# Patient Record
Sex: FEMALE | Race: ASIAN | Employment: UNEMPLOYED | URBAN - METROPOLITAN AREA
[De-identification: names, ages, dates, MRNs, and addresses within clinical notes are randomized per-mention and may not be internally consistent; named-entity substitution may affect disease eponyms.]

---

## 2019-08-27 ENCOUNTER — HOSPITAL ENCOUNTER (OUTPATIENT)
Age: 39
Setting detail: OBSERVATION
Discharge: ANOTHER HOSPITAL | End: 2019-08-28
Attending: EMERGENCY MEDICINE | Admitting: FAMILY MEDICINE
Payer: COMMERCIAL

## 2019-08-27 ENCOUNTER — APPOINTMENT (OUTPATIENT)
Dept: CT IMAGING | Age: 39
End: 2019-08-27
Attending: NURSE PRACTITIONER
Payer: COMMERCIAL

## 2019-08-27 DIAGNOSIS — R45.1 RESTLESSNESS AND AGITATION: ICD-10-CM

## 2019-08-27 DIAGNOSIS — T50.904A INGESTION OF UNKNOWN DRUG, UNDETERMINED INTENT, INITIAL ENCOUNTER: Primary | ICD-10-CM

## 2019-08-27 PROBLEM — T65.91XA INGESTION OF TOXIC SUBSTANCE: Status: ACTIVE | Noted: 2019-08-27

## 2019-08-27 LAB
ALBUMIN SERPL-MCNC: 4.3 G/DL (ref 3.5–5)
ALBUMIN/GLOB SERPL: 1.2 {RATIO} (ref 1.1–2.2)
ALP SERPL-CCNC: 45 U/L (ref 45–117)
ALT SERPL-CCNC: 33 U/L (ref 12–78)
AMPHET UR QL SCN: NEGATIVE
ANION GAP SERPL CALC-SCNC: 10 MMOL/L (ref 5–15)
APAP SERPL-MCNC: <2 UG/ML (ref 10–30)
APPEARANCE UR: CLEAR
AST SERPL-CCNC: 18 U/L (ref 15–37)
BACTERIA URNS QL MICRO: NEGATIVE /HPF
BARBITURATES UR QL SCN: NEGATIVE
BASOPHILS # BLD: 0 K/UL (ref 0–0.1)
BASOPHILS NFR BLD: 0 % (ref 0–1)
BENZODIAZ UR QL: NEGATIVE
BILIRUB SERPL-MCNC: 0.3 MG/DL (ref 0.2–1)
BILIRUB UR QL: NEGATIVE
BUN SERPL-MCNC: 10 MG/DL (ref 6–20)
BUN/CREAT SERPL: 15 (ref 12–20)
CALCIUM SERPL-MCNC: 9.8 MG/DL (ref 8.5–10.1)
CANNABINOIDS UR QL SCN: NEGATIVE
CHLORIDE SERPL-SCNC: 104 MMOL/L (ref 97–108)
CK MB CFR SERPL CALC: NORMAL % (ref 0–2.5)
CK MB SERPL-MCNC: <1 NG/ML (ref 5–25)
CK SERPL-CCNC: 101 U/L (ref 26–192)
CO2 SERPL-SCNC: 27 MMOL/L (ref 21–32)
COCAINE UR QL SCN: NEGATIVE
COLOR UR: NORMAL
CREAT SERPL-MCNC: 0.66 MG/DL (ref 0.55–1.02)
DIFFERENTIAL METHOD BLD: NORMAL
DRUG SCRN COMMENT,DRGCM: NORMAL
EOSINOPHIL # BLD: 0 K/UL (ref 0–0.4)
EOSINOPHIL NFR BLD: 0 % (ref 0–7)
EPITH CASTS URNS QL MICRO: NORMAL /LPF
ERYTHROCYTE [DISTWIDTH] IN BLOOD BY AUTOMATED COUNT: 11.6 % (ref 11.5–14.5)
ETHANOL SERPL-MCNC: <10 MG/DL
GLOBULIN SER CALC-MCNC: 3.5 G/DL (ref 2–4)
GLUCOSE SERPL-MCNC: 106 MG/DL (ref 65–100)
GLUCOSE UR STRIP.AUTO-MCNC: NEGATIVE MG/DL
HCT VFR BLD AUTO: 40.1 % (ref 35–47)
HGB BLD-MCNC: 13.6 G/DL (ref 11.5–16)
HGB UR QL STRIP: NEGATIVE
IMM GRANULOCYTES # BLD AUTO: 0 K/UL (ref 0–0.04)
IMM GRANULOCYTES NFR BLD AUTO: 0 % (ref 0–0.5)
KETONES UR QL STRIP.AUTO: NEGATIVE MG/DL
LEUKOCYTE ESTERASE UR QL STRIP.AUTO: NEGATIVE
LYMPHOCYTES # BLD: 1.8 K/UL (ref 0.8–3.5)
LYMPHOCYTES NFR BLD: 23 % (ref 12–49)
MCH RBC QN AUTO: 30.9 PG (ref 26–34)
MCHC RBC AUTO-ENTMCNC: 33.9 G/DL (ref 30–36.5)
MCV RBC AUTO: 91.1 FL (ref 80–99)
METHADONE UR QL: NEGATIVE
MONOCYTES # BLD: 0.5 K/UL (ref 0–1)
MONOCYTES NFR BLD: 6 % (ref 5–13)
NEUTS SEG # BLD: 5.5 K/UL (ref 1.8–8)
NEUTS SEG NFR BLD: 71 % (ref 32–75)
NITRITE UR QL STRIP.AUTO: NEGATIVE
NRBC # BLD: 0 K/UL (ref 0–0.01)
NRBC BLD-RTO: 0 PER 100 WBC
OPIATES UR QL: NEGATIVE
PCP UR QL: NEGATIVE
PH UR STRIP: 7.5 [PH] (ref 5–8)
PLATELET # BLD AUTO: 231 K/UL (ref 150–400)
PMV BLD AUTO: 10.2 FL (ref 8.9–12.9)
POTASSIUM SERPL-SCNC: 3.7 MMOL/L (ref 3.5–5.1)
PROT SERPL-MCNC: 7.8 G/DL (ref 6.4–8.2)
PROT UR STRIP-MCNC: NEGATIVE MG/DL
RBC # BLD AUTO: 4.4 M/UL (ref 3.8–5.2)
RBC #/AREA URNS HPF: NORMAL /HPF (ref 0–5)
SALICYLATES SERPL-MCNC: <1.7 MG/DL (ref 2.8–20)
SODIUM SERPL-SCNC: 141 MMOL/L (ref 136–145)
SP GR UR REFRACTOMETRY: <1.005 (ref 1–1.03)
TROPONIN I SERPL-MCNC: <0.05 NG/ML
UA: UC IF INDICATED,UAUC: NORMAL
UROBILINOGEN UR QL STRIP.AUTO: 0.2 EU/DL (ref 0.2–1)
WBC # BLD AUTO: 7.9 K/UL (ref 3.6–11)
WBC URNS QL MICRO: NORMAL /HPF (ref 0–4)

## 2019-08-27 PROCEDURE — 99285 EMERGENCY DEPT VISIT HI MDM: CPT

## 2019-08-27 PROCEDURE — 96376 TX/PRO/DX INJ SAME DRUG ADON: CPT

## 2019-08-27 PROCEDURE — 80053 COMPREHEN METABOLIC PANEL: CPT

## 2019-08-27 PROCEDURE — 81001 URINALYSIS AUTO W/SCOPE: CPT

## 2019-08-27 PROCEDURE — 74011250636 HC RX REV CODE- 250/636: Performed by: EMERGENCY MEDICINE

## 2019-08-27 PROCEDURE — 74011250636 HC RX REV CODE- 250/636: Performed by: NURSE PRACTITIONER

## 2019-08-27 PROCEDURE — 85025 COMPLETE CBC W/AUTO DIFF WBC: CPT

## 2019-08-27 PROCEDURE — 99218 HC RM OBSERVATION: CPT

## 2019-08-27 PROCEDURE — 80307 DRUG TEST PRSMV CHEM ANLYZR: CPT

## 2019-08-27 PROCEDURE — 93005 ELECTROCARDIOGRAM TRACING: CPT

## 2019-08-27 PROCEDURE — 84484 ASSAY OF TROPONIN QUANT: CPT

## 2019-08-27 PROCEDURE — 36415 COLL VENOUS BLD VENIPUNCTURE: CPT

## 2019-08-27 PROCEDURE — 82550 ASSAY OF CK (CPK): CPT

## 2019-08-27 PROCEDURE — 96374 THER/PROPH/DIAG INJ IV PUSH: CPT

## 2019-08-27 PROCEDURE — 84703 CHORIONIC GONADOTROPIN ASSAY: CPT

## 2019-08-27 RX ORDER — LORAZEPAM 2 MG/ML
1 INJECTION INTRAMUSCULAR
Status: DISCONTINUED | OUTPATIENT
Start: 2019-08-27 | End: 2019-08-28

## 2019-08-27 RX ORDER — LORAZEPAM 2 MG/ML
1 INJECTION INTRAMUSCULAR
Status: COMPLETED | OUTPATIENT
Start: 2019-08-27 | End: 2019-08-27

## 2019-08-27 RX ADMIN — SODIUM CHLORIDE 1000 ML: 900 INJECTION, SOLUTION INTRAVENOUS at 19:51

## 2019-08-27 RX ADMIN — LORAZEPAM 1 MG: 2 INJECTION INTRAMUSCULAR; INTRAVENOUS at 21:30

## 2019-08-27 RX ADMIN — LORAZEPAM 1 MG: 2 INJECTION INTRAMUSCULAR; INTRAVENOUS at 22:11

## 2019-08-27 RX ADMIN — LORAZEPAM 1 MG: 2 INJECTION INTRAMUSCULAR; INTRAVENOUS at 23:05

## 2019-08-27 NOTE — ED NOTES
Primarily Mandarin speaking patient presents to ED with , son and daughter. Per  children, patient has not been acting herself this afternoon. Patient works in a "[employer] had complained of feet pain and took unknown [facility] herbal medication for c/o pain.  states patient has been acting unusual since this afternoon. Family denies known medical, surgical or social history.  reports patient has occasional c/o headaches.  denies any known regular medications. Patient is alert to name only. Has difficulty following commands. Appears confused to place, time and events. Emergency Department Nursing Plan of Care       The Nursing Plan of Care is developed from the Nursing assessment and Emergency Department Attending provider initial evaluation. The plan of care may be reviewed in the ED Provider note.     The Plan of Care was developed with the following considerations:   Patient / Family readiness to learn indicated by:does not verbalize understanding  Persons(s) to be included in education: patient, family and care giver  Barriers to Learning/Limitations:Mandarin    Signed     Bruno Wagner RN    8/27/2019   7:11 PM

## 2019-08-27 NOTE — ED NOTES
Verbal shift change report given to Zeus Arnold (oncoming nurse) by Shaila Tate RN (offgoing nurse). Report included the following information SBAR.

## 2019-08-27 NOTE — ED TRIAGE NOTES
Patient speaks very little [place], she has been working in [employer] today and is overheated, has vomited and has a headache. Pt is extremely anxious and has trouble following directions, her son assisting with translation in triage.

## 2019-08-27 NOTE — ED NOTES
Patients son reports primary language is [facility], [address]  Primary RN aware.

## 2019-08-28 ENCOUNTER — APPOINTMENT (OUTPATIENT)
Dept: CT IMAGING | Age: 39
End: 2019-08-28
Attending: NURSE PRACTITIONER
Payer: COMMERCIAL

## 2019-08-28 ENCOUNTER — APPOINTMENT (OUTPATIENT)
Dept: CT IMAGING | Age: 39
End: 2019-08-28
Attending: EMERGENCY MEDICINE
Payer: COMMERCIAL

## 2019-08-28 VITALS
HEART RATE: 87 BPM | OXYGEN SATURATION: 97 % | SYSTOLIC BLOOD PRESSURE: 138 MMHG | WEIGHT: 135 LBS | DIASTOLIC BLOOD PRESSURE: 97 MMHG | TEMPERATURE: 97.7 F | RESPIRATION RATE: 16 BRPM

## 2019-08-28 LAB
ATRIAL RATE: 79 BPM
CALCULATED P AXIS, ECG09: 60 DEGREES
CALCULATED R AXIS, ECG10: 51 DEGREES
CALCULATED T AXIS, ECG11: 30 DEGREES
CK MB CFR SERPL CALC: 1.1 % (ref 0–2.5)
CK MB SERPL-MCNC: 13.1 NG/ML (ref 5–25)
CK SERPL-CCNC: 1188 U/L (ref 26–192)
DIAGNOSIS, 93000: NORMAL
HCG SERPL QL: NEGATIVE
P-R INTERVAL, ECG05: 146 MS
Q-T INTERVAL, ECG07: 386 MS
QRS DURATION, ECG06: 84 MS
QTC CALCULATION (BEZET), ECG08: 442 MS
VENTRICULAR RATE, ECG03: 79 BPM

## 2019-08-28 PROCEDURE — 96374 THER/PROPH/DIAG INJ IV PUSH: CPT

## 2019-08-28 PROCEDURE — 99218 HC RM OBSERVATION: CPT

## 2019-08-28 PROCEDURE — 70450 CT HEAD/BRAIN W/O DYE: CPT

## 2019-08-28 PROCEDURE — 74011250637 HC RX REV CODE- 250/637: Performed by: EMERGENCY MEDICINE

## 2019-08-28 PROCEDURE — 96372 THER/PROPH/DIAG INJ SC/IM: CPT

## 2019-08-28 PROCEDURE — 96376 TX/PRO/DX INJ SAME DRUG ADON: CPT

## 2019-08-28 PROCEDURE — 82550 ASSAY OF CK (CPK): CPT

## 2019-08-28 PROCEDURE — 74011250636 HC RX REV CODE- 250/636: Performed by: EMERGENCY MEDICINE

## 2019-08-28 PROCEDURE — 74011250636 HC RX REV CODE- 250/636: Performed by: FAMILY MEDICINE

## 2019-08-28 PROCEDURE — 36415 COLL VENOUS BLD VENIPUNCTURE: CPT

## 2019-08-28 PROCEDURE — 74011250636 HC RX REV CODE- 250/636: Performed by: HOSPITALIST

## 2019-08-28 PROCEDURE — 96375 TX/PRO/DX INJ NEW DRUG ADDON: CPT

## 2019-08-28 RX ORDER — DIPHENHYDRAMINE HCL 25 MG
25 CAPSULE ORAL
Status: DISCONTINUED | OUTPATIENT
Start: 2019-08-28 | End: 2019-08-28

## 2019-08-28 RX ORDER — ONDANSETRON 2 MG/ML
4 INJECTION INTRAMUSCULAR; INTRAVENOUS
Qty: 1 VIAL | Refills: 0 | Status: SHIPPED
Start: 2019-08-28

## 2019-08-28 RX ORDER — ONDANSETRON 2 MG/ML
4 INJECTION INTRAMUSCULAR; INTRAVENOUS
Status: DISCONTINUED | OUTPATIENT
Start: 2019-08-28 | End: 2019-08-28 | Stop reason: HOSPADM

## 2019-08-28 RX ORDER — HALOPERIDOL 5 MG/ML
5 INJECTION INTRAMUSCULAR ONCE
Status: COMPLETED | OUTPATIENT
Start: 2019-08-28 | End: 2019-08-28

## 2019-08-28 RX ORDER — LORAZEPAM 2 MG/ML
1 INJECTION INTRAMUSCULAR
Status: DISCONTINUED | OUTPATIENT
Start: 2019-08-28 | End: 2019-08-28

## 2019-08-28 RX ORDER — SODIUM CHLORIDE 0.9 % (FLUSH) 0.9 %
5-40 SYRINGE (ML) INJECTION EVERY 8 HOURS
Status: DISCONTINUED | OUTPATIENT
Start: 2019-08-28 | End: 2019-08-28 | Stop reason: HOSPADM

## 2019-08-28 RX ORDER — DIPHENHYDRAMINE HYDROCHLORIDE 50 MG/ML
25 INJECTION, SOLUTION INTRAMUSCULAR; INTRAVENOUS ONCE
Status: COMPLETED | OUTPATIENT
Start: 2019-08-28 | End: 2019-08-28

## 2019-08-28 RX ORDER — LORAZEPAM 2 MG/ML
1 INJECTION INTRAMUSCULAR
Status: DISCONTINUED | OUTPATIENT
Start: 2019-08-28 | End: 2019-08-28 | Stop reason: HOSPADM

## 2019-08-28 RX ORDER — SODIUM CHLORIDE 9 MG/ML
125 INJECTION, SOLUTION INTRAVENOUS CONTINUOUS
Status: DISCONTINUED | OUTPATIENT
Start: 2019-08-28 | End: 2019-08-28 | Stop reason: HOSPADM

## 2019-08-28 RX ORDER — DIPHENHYDRAMINE HYDROCHLORIDE 50 MG/ML
INJECTION, SOLUTION INTRAMUSCULAR; INTRAVENOUS
Status: DISPENSED
Start: 2019-08-28 | End: 2019-08-28

## 2019-08-28 RX ORDER — DIPHENHYDRAMINE HYDROCHLORIDE 50 MG/ML
25 INJECTION, SOLUTION INTRAMUSCULAR; INTRAVENOUS
Status: COMPLETED | OUTPATIENT
Start: 2019-08-28 | End: 2019-08-28

## 2019-08-28 RX ORDER — LORAZEPAM 2 MG/ML
2 INJECTION INTRAMUSCULAR ONCE
Status: COMPLETED | OUTPATIENT
Start: 2019-08-28 | End: 2019-08-28

## 2019-08-28 RX ORDER — LORAZEPAM 2 MG/ML
1 INJECTION INTRAMUSCULAR
Qty: 1 VIAL | Refills: 0 | Status: SHIPPED
Start: 2019-08-28

## 2019-08-28 RX ORDER — SODIUM CHLORIDE 0.9 % (FLUSH) 0.9 %
5-40 SYRINGE (ML) INJECTION AS NEEDED
Status: DISCONTINUED | OUTPATIENT
Start: 2019-08-28 | End: 2019-08-28 | Stop reason: HOSPADM

## 2019-08-28 RX ORDER — LORAZEPAM 2 MG/ML
1 INJECTION INTRAMUSCULAR
Status: COMPLETED | OUTPATIENT
Start: 2019-08-28 | End: 2019-08-28

## 2019-08-28 RX ORDER — SODIUM CHLORIDE 9 MG/ML
125 INJECTION, SOLUTION INTRAVENOUS CONTINUOUS
Qty: 1000 ML | Refills: 0 | Status: SHIPPED
Start: 2019-08-28

## 2019-08-28 RX ADMIN — HALOPERIDOL LACTATE 5 MG: 5 INJECTION, SOLUTION INTRAMUSCULAR at 07:02

## 2019-08-28 RX ADMIN — Medication 10 ML: at 06:07

## 2019-08-28 RX ADMIN — LORAZEPAM 1 MG: 2 INJECTION INTRAMUSCULAR; INTRAVENOUS at 00:41

## 2019-08-28 RX ADMIN — LORAZEPAM 1 MG: 2 INJECTION INTRAMUSCULAR; INTRAVENOUS at 03:57

## 2019-08-28 RX ADMIN — LORAZEPAM 1 MG: 2 INJECTION INTRAMUSCULAR; INTRAVENOUS at 12:38

## 2019-08-28 RX ADMIN — DIPHENHYDRAMINE HYDROCHLORIDE 25 MG: 50 INJECTION, SOLUTION INTRAMUSCULAR; INTRAVENOUS at 00:32

## 2019-08-28 RX ADMIN — Medication 10 ML: at 04:49

## 2019-08-28 RX ADMIN — SODIUM CHLORIDE 125 ML/HR: 900 INJECTION, SOLUTION INTRAVENOUS at 04:59

## 2019-08-28 RX ADMIN — DIPHENHYDRAMINE HYDROCHLORIDE 25 MG: 50 INJECTION INTRAMUSCULAR; INTRAVENOUS at 01:19

## 2019-08-28 RX ADMIN — HALOPERIDOL LACTATE 5 MG: 5 INJECTION, SOLUTION INTRAMUSCULAR at 00:59

## 2019-08-28 RX ADMIN — LORAZEPAM 1 MG: 2 INJECTION INTRAMUSCULAR; INTRAVENOUS at 11:08

## 2019-08-28 RX ADMIN — LORAZEPAM 2 MG: 2 INJECTION INTRAMUSCULAR; INTRAVENOUS at 06:06

## 2019-08-28 NOTE — ED NOTES
Per patients children, pt has not been sleeping for the past 2 weeks. 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 Pt is exhausted and over worked. pts children stated they went to [place] to get a \"chinese herbal medication\" that is used for sleep. Pt took the medication and felt dizzy but was able to sleep. Today, patient was acting normal. She went to work and took the medication while she was working. Patients son stated \"her face was red and she was walking funny complaining of a headache. \" pt then laid down and woke up confused. pts family had concerns about her having a heat stroke due to the restaurant being hot. Family stated she had a similar episode before several years ago. Pt is currently hallucinating and confused. Unable to follow simple commands without the help of her children. Pt able to speak [address] but is going in and out with mandarin.

## 2019-08-28 NOTE — ED NOTES
Assumed care of pt for task only.  Contacted [facility] ([phone]), spoke with Krystal Muñiz; pre-certification for admission form completed by [facility] Diaz [facility]

## 2019-08-28 NOTE — ED NOTES
Assumed care of pt for task only. Reference #[id number] provided by Jose(Jordy Waller) pre-certification/prior-authorization for admission. Per Yovanny Pelaez, \"information will be left for them to review in the morning and they will give you a call in the morning for any missing information\".

## 2019-08-28 NOTE — PROGRESS NOTES
0710) Bedside shift change report given to Luis Eduardo Monroe RN (oncoming nurse) by Lucila Nuñez RN (offgoing nurse). Report included the following information SBAR, Kardex, [facility] and Cardiac Rhythm NSR to ST.   0830) pt  give number for  Dr. Suzanne Tan [phone]-- [place] doctor  3650) remove restraints. Pt visual hallucinations. When asked if pt had headace, pt stated \"sometimes\"  Pt unable to state name  1017) Soft ankle restraints started. Pt confused; trying to stand up in bed. 56) Mandarin telephone  [phone]. Discuss with pt  Rita Guillaume, [phone], pt sometimes has headaches and backpain due to childhood injury--fell off [employer].  stated no anxiety or depression at home, but sometimes pt very angry home. Pt  denies any concerns about his safety or safety of teenage son and daughter at home;  reiterated that she is never like this [confused, hallucinating] at home.  stated she has been taking tea for at least 3 days; stated she had 1 pint of tea at 5 pm yesterday and a quart the days before. Some database questions asked--stated she had a PCP in [facility]. Pt  stated he needed to check on his son at home. 1130) Discuss with Eder, care manager, transfer due to pt insurance coverage. Translating service used for pt permission  [phone]) One attempt IV start  [phone]) Discuss with Kristian Osborne, poison control, pt vitals--pt hallucinating not able to state her name. Recommend higher levels sedation with ativan or versed; recommend IV fluids and repeat EKG. Recommend labs CMP, CPK, lacate, venous blood gas.

## 2019-08-28 NOTE — ED NOTES
Spoke with hospitalist to who stated \"you need to get the tele machine so I can see the patient. I only have 10 minutes or im not seeing the patient. \"

## 2019-08-28 NOTE — ED NOTES
Family member brought herbal tea in that patient took and requested for us to test it. Family members seem to be overwhelmed with situation and keep apologizing for patients behavior. pts son is staying in room with patient while pts  keeps walking out of room frustrated.

## 2019-08-28 NOTE — ED NOTES
After 4 of ativan and benadryl, pt still increasingly getting more and more agitated. Pt becoming violent and hitting and kicking staff. Pt speaking gibberish and not making sense. pts  is unable to interpret/ understand patient. Updated hospitalist on care of patient and expressed concerns about the patient becoming more and more agitated. Per hospitalist, pt was to be placed in 4 point restraints, given haldol IM and more benadryl. Once patient is able to hold still CT scan must be completed to rule out a brain bleed/underlying issues.

## 2019-08-28 NOTE — DISCHARGE SUMMARY
Hospitalist Discharge Summary     Patient ID:  Janna Spicer  [id number]  74 y.o.  1980  8/27/2019    PCP on record: None    Admit date: 8/27/2019  Discharge date and time: 8/28/2019    DISCHARGE DIAGNOSIS:  Acute confusional state      CONSULTATIONS:  IP CONSULT TO HOSPITALIST  IP CONSULT TO NEUROLOGY  IP CONSULT TO PSYCHIATRY    Excerpted HPI from H&P of Kami Chapa MD:  Ms. Essence Burnett is a 43 y.o.  female who is admitted with toxic ingestion of herbs, hallucinations. Ms. Essence Burnett presented to the Emergency Department today complaining of hallucinations. Patient took chinese herbal medication for pain in her feet. Patient has sudden onset confusion and agitation this afternoon. Patient was having hallucinations. Patient family noted she was diaphoretic, face was flushed. Patient is usually AAOx3, however she is AAOx1, disoriented, confused. Patient does not have a medical history, not on any prescription medication. Patient is a poor historian due to acute condition. Patient admitted for observation due to toxic ingestion and mental status. Will consult neurology for further recommendations.        ______________________________________________________________________  DISCHARGE SUMMARY/HOSPITAL COURSE:  for full details see H&P, daily progress notes, labs, consult notes.    Toxic Ingestion of Herbal Meds  UDS negative     IVF hydration  Monitor LFTs     Altered Mental Status   Due to herbal meds,toxic ingestion     restraints for safety   IM Haldol in ED     CT head ordered  Neuro checks  Neuro consult     DVT Prophylaxis: SCDs  GI Prophylaxis:IV protonix  Code status: full code  Surrogate Decision Maker:   Baseline: AAOX3  Called [phone] to speak with presciber twice, no answer           _  Recent Results (from the past 24 hour(s))   CBC WITH AUTOMATED DIFF    Collection Time: 08/27/19  7:16 PM   Result Value Ref Range    WBC 7.9 3.6 - 11.0 K/uL    RBC 4.40 3.80 - 5.20 M/uL    HGB 13.6 11.5 - 16.0 g/dL    HCT 40.1 35.0 - 47.0 %    MCV 91.1 80.0 - 99.0 FL    MCH 30.9 26.0 - 34.0 PG    MCHC 33.9 30.0 - 36.5 g/dL    RDW 11.6 11.5 - 14.5 %    PLATELET 848 639 - 477 K/uL    MPV 10.2 8.9 - 12.9 FL    NRBC 0.0 0  WBC    ABSOLUTE NRBC 0.00 0.00 - 0.01 K/uL    NEUTROPHILS 71 32 - 75 %    LYMPHOCYTES 23 12 - 49 %    MONOCYTES 6 5 - 13 %    EOSINOPHILS 0 0 - 7 %    BASOPHILS 0 0 - 1 %    IMMATURE GRANULOCYTES 0 0.0 - 0.5 %    ABS. NEUTROPHILS 5.5 1.8 - 8.0 K/UL    ABS. LYMPHOCYTES 1.8 0.8 - 3.5 K/UL    ABS. MONOCYTES 0.5 0.0 - 1.0 K/UL    ABS. EOSINOPHILS 0.0 0.0 - 0.4 K/UL    ABS. BASOPHILS 0.0 0.0 - 0.1 K/UL    ABS. IMM. GRANS. 0.0 0.00 - 0.04 K/UL    DF AUTOMATED     METABOLIC PANEL, COMPREHENSIVE    Collection Time: 08/27/19  7:16 PM   Result Value Ref Range    Sodium 141 136 - 145 mmol/L    Potassium 3.7 3.5 - 5.1 mmol/L    Chloride 104 97 - 108 mmol/L    CO2 27 21 - 32 mmol/L    Anion gap 10 5 - 15 mmol/L    Glucose 106 (H) 65 - 100 mg/dL    BUN 10 6 - 20 MG/DL    Creatinine 0.66 0.55 - 1.02 MG/DL    BUN/Creatinine ratio 15 12 - 20      GFR est AA >60 >60 ml/min/1.73m2    GFR est non-AA >60 >60 ml/min/1.73m2    Calcium 9.8 8.5 - 10.1 MG/DL    Bilirubin, total 0.3 0.2 - 1.0 MG/DL    ALT (SGPT) 33 12 - 78 U/L    AST (SGOT) 18 15 - 37 U/L    Alk.  phosphatase 45 45 - 117 U/L    Protein, total 7.8 6.4 - 8.2 g/dL    Albumin 4.3 3.5 - 5.0 g/dL    Globulin 3.5 2.0 - 4.0 g/dL    A-G Ratio 1.2 1.1 - 2.2     CK W/ CKMB & INDEX    Collection Time: 08/27/19  7:16 PM   Result Value Ref Range     26 - 192 U/L    CK - MB <1.0 <3.6 NG/ML    CK-MB Index Cannot be calculated 0.0 - 2.5     TROPONIN I    Collection Time: 08/27/19  7:16 PM   Result Value Ref Range    Troponin-I, Qt. <0.05 <0.05 ng/mL   ACETAMINOPHEN    Collection Time: 08/27/19  7:16 PM   Result Value Ref Range    Acetaminophen level <2 (L) 10 - 30 ug/mL   SALICYLATE    Collection Time: 08/27/19  7:16 PM   Result Value Ref Range Salicylate level <9.2 (L) 2.8 - 20.0 MG/DL   ETHYL ALCOHOL    Collection Time: 08/27/19  7:16 PM   Result Value Ref Range    ALCOHOL(ETHYL),SERUM <10 <10 MG/DL   HCG QL SERUM    Collection Time: 08/27/19  7:16 PM   Result Value Ref Range    HCG, Ql. NEGATIVE  NEG     EKG, 12 LEAD, INITIAL    Collection Time: 08/27/19  7:20 PM   Result Value Ref Range    Ventricular Rate 79 BPM    Atrial Rate 79 BPM    P-R Interval 146 ms    QRS Duration 84 ms    Q-T Interval 386 ms    QTC Calculation (Bezet) 442 ms    Calculated P Axis 60 degrees    Calculated R Axis 51 degrees    Calculated T Axis 30 degrees    Diagnosis       Normal sinus rhythm  Possible Left atrial enlargement  Borderline ECG  No previous ECGs available     URINALYSIS W/ REFLEX CULTURE    Collection Time: 08/27/19  8:50 PM   Result Value Ref Range    Color YELLOW/STRAW      Appearance CLEAR CLEAR      Specific gravity <1.005 1.003 - 1.030    pH (UA) 7.5 5.0 - 8.0      Protein NEGATIVE  NEG mg/dL    Glucose NEGATIVE  NEG mg/dL    Ketone NEGATIVE  NEG mg/dL    Bilirubin NEGATIVE  NEG      Blood NEGATIVE  NEG      Urobilinogen 0.2 0.2 - 1.0 EU/dL    Nitrites NEGATIVE  NEG      Leukocyte Esterase NEGATIVE  NEG      WBC 0-4 0 - 4 /hpf    RBC 0-5 0 - 5 /hpf    Epithelial cells FEW FEW /lpf    Bacteria NEGATIVE  NEG /hpf    UA:UC IF INDICATED CULTURE NOT INDICATED BY UA RESULT CNI     DRUG SCREEN, URINE    Collection Time: 08/27/19  8:50 PM   Result Value Ref Range    AMPHETAMINES NEGATIVE  NEG      BARBITURATES NEGATIVE  NEG      BENZODIAZEPINES NEGATIVE  NEG      COCAINE NEGATIVE  NEG      METHADONE NEGATIVE  NEG      OPIATES NEGATIVE  NEG      PCP(PHENCYCLIDINE) NEGATIVE  NEG      THC (TH-CANNABINOL) NEGATIVE  NEG      Drug screen comment (NOTE)    _ECG; Normal sinus rhythm   Possible Left atrial enlargement   Borderline ECG   No previous ECGs available _____________________________________________________________________  Patient seen and examined by me on discharge day. Pertinent Findings:  Gen:    Not in distress  Chest: Clear lungs  CVS:   Regular rhythm. No edema  Abd:  Soft, not distended, not tender  Neuro:  Agitated and restless   _______________________________________________________________________  DISCHARGE MEDICATIONS:   Current Discharge Medication List      START taking these medications    Details   0.9% sodium chloride solution 125 mL/hr by IntraVENous route continuous. Qty: 1000 mL, Refills: 0      LORazepam (ATIVAN) 2 mg/mL injection 0.5 mL by IntraMUSCular route every two (2) hours as needed for Other (Agitation). Max Daily Amount: 12 mg.  Qty: 1 Vial, Refills: 0    Associated Diagnoses: Restlessness and agitation      ondansetron (ZOFRAN) 4 mg/2 mL soln 2 mL by IntraVENous route every four (4) hours as needed for Nausea. Qty: 1 Vial, Refills: 0               Patient Follow Up Instructions:    Activity: Bedrest  Diet: NPO  Wound Care: None needed    Follow-up with Hospitalist when arrived in [facility]   Follow-up tests/labs Per Hospitalist   Follow-up Information     Follow up With Specialties Details Why Contact Info    None    None (395) Patient stated that they have no PCP          ________________________________________________________________    Risk of deterioration: Moderate    Condition at Discharge:  Stable  __________________________________________________________________    Disposition  Acute care hospital     ____________________________________________________________________    Code Status: Full Code  ___________________________________________________________________      Total time in minutes spent coordinating this discharge (includes going over instructions, follow-up, prescriptions, and preparing report for sign off to her PCP) :  35 minutes    Signed:  Terrance Best MD

## 2019-08-28 NOTE — PROGRESS NOTES
Poison control contacted about patients continued agitation and combative behavior. Poison control recommended supportive care, benzos to keep calm, repeat labs, including a ck and PH. Poison control states this may take up to a few days to clear her system. Dr. Neri Doctor notified of patients situation and conversation with poison control.

## 2019-08-28 NOTE — PROGRESS NOTES
Behavioral Restraint Face-to-Face Evaluation  (must be completed within one hour of initiation of restraints)      Evaluate immediate situation:  patient is combative, agitated, unable to be redirected due to unknown ingestion of herbal tea. Pt went to a [facility] dr in [place] [address] [place] [address] and was given this herbal tea for headache treatment. Reaction to intervention: procedure explained to patient, pt is still combative, pt unable to answer questions appropriately. Medical Condition/Assessment: pt is unstable at this time due to confusion, agitation and restlessness. pts  at bedside. he has been provided all procedural information. He is ok with treatment plan. Behavioral Condition/Assessment: combative, agitated, restless, yelling. The patients review of systems, history, medications, and recent labs were reviewed at this time.      Continue/Discontinue restraints at this time: Continue

## 2019-08-28 NOTE — PROGRESS NOTES
Behavioral Restraint Face-to-Face Evaluation  (must be completed within one hour of initiation of restraints)      Evaluate immediate situation:  Pt was removed from restraints 9:35 am at the patients  request. The patient is unable to be controlled. She is attempting to get out of the bed, pt is unstable on her feet. She is pulling/feeling & swinging her arms at her . Unable to understand what the patient is saying at this time. Pt was re-directed to bed, help multiple times & adjusted in bed for safety. Pt unable to follow commands. Pt is swinging arms uncontrollably at staff. Reaction to intervention: Pt agitated, unable to follow commands. Unable to unstand what the patient is saying at this time. Patients  is attempting to re-direct the patient. Medical Condition/Assessment: AMS. Unstable on feet. Agitated. Behavioral Condition/Assessment: Pt is unable to follow simply commands. Pt swinging arms at  & staff. The patients review of systems, history, medications, and recent labs were reviewed at this time. Continue/Discontinue restraints at this time: Continue     Patient was placed in bilateral ankle restraints. Unit tech at bedside. Patients  at head of bed, he was explained the procedure & understands.

## 2019-08-28 NOTE — ED PROVIDER NOTES
EMERGENCY DEPARTMENT HISTORY AND PHYSICAL EXAM    Date: 8/27/2019  Patient Name: Carlota Vincent    History of Presenting Illness     Chief Complaint   Patient presents with    Vomiting         History Provided By: Patient family  son daughter    Chief Complaint: behavior change    HPI: Carlota Vincent is a 43 y.o. female with a PMH of No significant past medical history who presents with behavior change noted by her family just prior to arrival.  Patient according to patient's son and daughter took some [facility] herbal medication obtained in [place] from a [facility] doctor in [facility] [place], while she was at work in the [employer] today to help her sleep. Patient also uses the medication to relieve headaches. Patient's  states through  that patient has taken the medication in the past but son and daughter states she has not taken medication prior to today. 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 Patient son states after patient took the medication she became diaphoretic and her face became red. Patient's daughter states patient was fine and had driven her car prior to going to work today states patient speaks [place]. She states this behavior is new for the patient. Patient's  is very concerned that there is something wrong with the patient's heart but denies cardiac history. Olive Velasco denies patient has any past medical history or behavioral health problems. Denies any surgical history Because there are no symptoms or complaints of pain, there is no reported quality, severity, modifying factors, or associated signs and symptoms reported. PCP: None        Past History     Past Medical History:  History reviewed. No pertinent past medical history. Past Surgical History:  History reviewed. No pertinent surgical history. Family History:  History reviewed. No pertinent family history.     Social History:  Social History     Tobacco Use    Smoking status: Never Smoker    Smokeless tobacco: Never Used   Substance Use Topics    Alcohol use: Not Currently    Drug use: Not Currently       Allergies:  No Known Allergies      Review of Systems   ROS unable to perform due to behavioral problem    Physical Exam     Vitals:    08/27/19 1834   BP: 123/87   Pulse: 83   Resp: 22   Temp: 97.3 °F (36.3 °C)   SpO2: 98%   Weight: 61.2 kg (135 lb)     Physical Exam   Constitutional: She appears well-developed and well-nourished. No distress. HENT:   Head: Normocephalic and atraumatic. Right Ear: External ear normal.   Left Ear: External ear normal.   Nose: Nose normal.   Mouth/Throat: Oropharynx is clear and moist.   Eyes: Conjunctivae are normal.   Pupils dilated     Neck: Normal range of motion. Neck supple. Cardiovascular: Normal rate and regular rhythm. Pulmonary/Chest: Effort normal and breath sounds normal. No respiratory distress. She has no wheezes. Abdominal: Soft. Bowel sounds are normal. There is no tenderness. Musculoskeletal: Normal range of motion. Lymphadenopathy:     She has no cervical adenopathy. Neurological: No cranial nerve deficit. Coordination normal.   Answers questions yes and no and responds to direction she does not oriented to person time or place   Skin: Skin is warm and dry. No rash noted. Psychiatric:   Patient is cooperative making strange statements about food in the [address] patient is unable to focus patient does respond to redirection   Nursing note and vitals reviewed.         Diagnostic Study Results     Labs -     Recent Results (from the past 12 hour(s))   CBC WITH AUTOMATED DIFF    Collection Time: 08/27/19  7:16 PM   Result Value Ref Range    WBC 7.9 3.6 - 11.0 K/uL    RBC 4.40 3.80 - 5.20 M/uL    HGB 13.6 11.5 - 16.0 g/dL    HCT 40.1 35.0 - 47.0 %    MCV 91.1 80.0 - 99.0 FL    MCH 30.9 26.0 - 34.0 PG    MCHC 33.9 30.0 - 36.5 g/dL    RDW 11.6 11.5 - 14.5 %    PLATELET 867 264 - 484 K/uL    MPV 10.2 8.9 - 12.9 FL    NRBC 0.0 0  WBC    ABSOLUTE NRBC 0.00 0.00 - 0.01 K/uL    NEUTROPHILS 71 32 - 75 %    LYMPHOCYTES 23 12 - 49 %    MONOCYTES 6 5 - 13 %    EOSINOPHILS 0 0 - 7 %    BASOPHILS 0 0 - 1 %    IMMATURE GRANULOCYTES 0 0.0 - 0.5 %    ABS. NEUTROPHILS 5.5 1.8 - 8.0 K/UL    ABS. LYMPHOCYTES 1.8 0.8 - 3.5 K/UL    ABS. MONOCYTES 0.5 0.0 - 1.0 K/UL    ABS. EOSINOPHILS 0.0 0.0 - 0.4 K/UL    ABS. BASOPHILS 0.0 0.0 - 0.1 K/UL    ABS. IMM. GRANS. 0.0 0.00 - 0.04 K/UL    DF AUTOMATED     METABOLIC PANEL, COMPREHENSIVE    Collection Time: 08/27/19  7:16 PM   Result Value Ref Range    Sodium 141 136 - 145 mmol/L    Potassium 3.7 3.5 - 5.1 mmol/L    Chloride 104 97 - 108 mmol/L    CO2 27 21 - 32 mmol/L    Anion gap 10 5 - 15 mmol/L    Glucose 106 (H) 65 - 100 mg/dL    BUN 10 6 - 20 MG/DL    Creatinine 0.66 0.55 - 1.02 MG/DL    BUN/Creatinine ratio 15 12 - 20      GFR est AA >60 >60 ml/min/1.73m2    GFR est non-AA >60 >60 ml/min/1.73m2    Calcium 9.8 8.5 - 10.1 MG/DL    Bilirubin, total 0.3 0.2 - 1.0 MG/DL    ALT (SGPT) 33 12 - 78 U/L    AST (SGOT) 18 15 - 37 U/L    Alk.  phosphatase 45 45 - 117 U/L    Protein, total 7.8 6.4 - 8.2 g/dL    Albumin 4.3 3.5 - 5.0 g/dL    Globulin 3.5 2.0 - 4.0 g/dL    A-G Ratio 1.2 1.1 - 2.2     CK W/ CKMB & INDEX    Collection Time: 08/27/19  7:16 PM   Result Value Ref Range     26 - 192 U/L    CK - MB <1.0 <3.6 NG/ML    CK-MB Index Cannot be calculated 0.0 - 2.5     TROPONIN I    Collection Time: 08/27/19  7:16 PM   Result Value Ref Range    Troponin-I, Qt. <0.05 <0.05 ng/mL   ACETAMINOPHEN    Collection Time: 08/27/19  7:16 PM   Result Value Ref Range    Acetaminophen level <2 (L) 10 - 30 ug/mL   SALICYLATE    Collection Time: 08/27/19  7:16 PM   Result Value Ref Range    Salicylate level <3.7 (L) 2.8 - 20.0 MG/DL   ETHYL ALCOHOL    Collection Time: 08/27/19  7:16 PM   Result Value Ref Range    ALCOHOL(ETHYL),SERUM <10 <10 MG/DL   EKG, 12 LEAD, INITIAL    Collection Time: 08/27/19  7:20 PM   Result Value Ref Range    Ventricular Rate 79 BPM    Atrial Rate 79 BPM    P-R Interval 146 ms    QRS Duration 84 ms    Q-T Interval 386 ms    QTC Calculation (Bezet) 442 ms    Calculated P Axis 60 degrees    Calculated R Axis 51 degrees    Calculated T Axis 30 degrees    Diagnosis       Normal sinus rhythm  Possible Left atrial enlargement  Borderline ECG  No previous ECGs available     URINALYSIS W/ REFLEX CULTURE    Collection Time: 08/27/19  8:50 PM   Result Value Ref Range    Color YELLOW/STRAW      Appearance CLEAR CLEAR      Specific gravity <1.005 1.003 - 1.030    pH (UA) 7.5 5.0 - 8.0      Protein NEGATIVE  NEG mg/dL    Glucose NEGATIVE  NEG mg/dL    Ketone NEGATIVE  NEG mg/dL    Bilirubin NEGATIVE  NEG      Blood NEGATIVE  NEG      Urobilinogen 0.2 0.2 - 1.0 EU/dL    Nitrites NEGATIVE  NEG      Leukocyte Esterase NEGATIVE  NEG      WBC 0-4 0 - 4 /hpf    RBC 0-5 0 - 5 /hpf    Epithelial cells FEW FEW /lpf    Bacteria NEGATIVE  NEG /hpf    UA:UC IF INDICATED CULTURE NOT INDICATED BY UA RESULT CNI     DRUG SCREEN, URINE    Collection Time: 08/27/19  8:50 PM   Result Value Ref Range    AMPHETAMINES NEGATIVE  NEG      BARBITURATES NEGATIVE  NEG      BENZODIAZEPINES NEGATIVE  NEG      COCAINE NEGATIVE  NEG      METHADONE NEGATIVE  NEG      OPIATES NEGATIVE  NEG      PCP(PHENCYCLIDINE) NEGATIVE  NEG      THC (TH-CANNABINOL) NEGATIVE  NEG      Drug screen comment (NOTE)        Radiologic Studies -   No orders to display     CT Results  (Last 48 hours)    None        CXR Results  (Last 48 hours)    None            Medical Decision Making   I am the first provider for this patient. I reviewed the vital signs, available nursing notes, past medical history, past surgical history, family history and social history. Vital Signs-Reviewed the patient's vital signs.     Records Reviewed: Nursing Notes    ED Course as of Aug 27 2210   Tue Aug 27, 2019 2100 Discussed findings with Dr. Landry Tao hospitalist will observe patient for 1 hour determine patient disposition    [AN]   2140 Patient is up in room now walking behind the stretcher she has voided on the chair she does respond to redirection patient is folding linens on the bed patient has been given 1 mg of Ativan.    [AN]   2142 Patient increasingly agitated. Second dose of Ativan administered. [AN]      ED Course User Index  [AN] Celine Gilliland NP          Disposition:  Admit  10:11 PM  Patient is being admitted to the hospital.  The results of their tests and reasons for their admission have been discussed with them and/or available family. They convey agreement and understanding for the need to be admitted and for their admission diagnosis. Consultation has been made with the inpatient physician specialist for hospitalization. CT has been ordered and will be done after patient is less agitated. Per Dr. Zan Blake a second liter of IV fluid has been ordered    LABORATORY TESTS:  Recent Results (from the past 12 hour(s))   CBC WITH AUTOMATED DIFF    Collection Time: 08/27/19  7:16 PM   Result Value Ref Range    WBC 7.9 3.6 - 11.0 K/uL    RBC 4.40 3.80 - 5.20 M/uL    HGB 13.6 11.5 - 16.0 g/dL    HCT 40.1 35.0 - 47.0 %    MCV 91.1 80.0 - 99.0 FL    MCH 30.9 26.0 - 34.0 PG    MCHC 33.9 30.0 - 36.5 g/dL    RDW 11.6 11.5 - 14.5 %    PLATELET 505 486 - 023 K/uL    MPV 10.2 8.9 - 12.9 FL    NRBC 0.0 0  WBC    ABSOLUTE NRBC 0.00 0.00 - 0.01 K/uL    NEUTROPHILS 71 32 - 75 %    LYMPHOCYTES 23 12 - 49 %    MONOCYTES 6 5 - 13 %    EOSINOPHILS 0 0 - 7 %    BASOPHILS 0 0 - 1 %    IMMATURE GRANULOCYTES 0 0.0 - 0.5 %    ABS. NEUTROPHILS 5.5 1.8 - 8.0 K/UL    ABS. LYMPHOCYTES 1.8 0.8 - 3.5 K/UL    ABS. MONOCYTES 0.5 0.0 - 1.0 K/UL    ABS. EOSINOPHILS 0.0 0.0 - 0.4 K/UL    ABS. BASOPHILS 0.0 0.0 - 0.1 K/UL    ABS. IMM.  GRANS. 0.0 0.00 - 0.04 K/UL    DF AUTOMATED     METABOLIC PANEL, COMPREHENSIVE Collection Time: 08/27/19  7:16 PM   Result Value Ref Range    Sodium 141 136 - 145 mmol/L    Potassium 3.7 3.5 - 5.1 mmol/L    Chloride 104 97 - 108 mmol/L    CO2 27 21 - 32 mmol/L    Anion gap 10 5 - 15 mmol/L    Glucose 106 (H) 65 - 100 mg/dL    BUN 10 6 - 20 MG/DL    Creatinine 0.66 0.55 - 1.02 MG/DL    BUN/Creatinine ratio 15 12 - 20      GFR est AA >60 >60 ml/min/1.73m2    GFR est non-AA >60 >60 ml/min/1.73m2    Calcium 9.8 8.5 - 10.1 MG/DL    Bilirubin, total 0.3 0.2 - 1.0 MG/DL    ALT (SGPT) 33 12 - 78 U/L    AST (SGOT) 18 15 - 37 U/L    Alk.  phosphatase 45 45 - 117 U/L    Protein, total 7.8 6.4 - 8.2 g/dL    Albumin 4.3 3.5 - 5.0 g/dL    Globulin 3.5 2.0 - 4.0 g/dL    A-G Ratio 1.2 1.1 - 2.2     CK W/ CKMB & INDEX    Collection Time: 08/27/19  7:16 PM   Result Value Ref Range     26 - 192 U/L    CK - MB <1.0 <3.6 NG/ML    CK-MB Index Cannot be calculated 0.0 - 2.5     TROPONIN I    Collection Time: 08/27/19  7:16 PM   Result Value Ref Range    Troponin-I, Qt. <0.05 <0.05 ng/mL   ACETAMINOPHEN    Collection Time: 08/27/19  7:16 PM   Result Value Ref Range    Acetaminophen level <2 (L) 10 - 30 ug/mL   SALICYLATE    Collection Time: 08/27/19  7:16 PM   Result Value Ref Range    Salicylate level <5.5 (L) 2.8 - 20.0 MG/DL   ETHYL ALCOHOL    Collection Time: 08/27/19  7:16 PM   Result Value Ref Range    ALCOHOL(ETHYL),SERUM <10 <10 MG/DL   EKG, 12 LEAD, INITIAL    Collection Time: 08/27/19  7:20 PM   Result Value Ref Range    Ventricular Rate 79 BPM    Atrial Rate 79 BPM    P-R Interval 146 ms    QRS Duration 84 ms    Q-T Interval 386 ms    QTC Calculation (Bezet) 442 ms    Calculated P Axis 60 degrees    Calculated R Axis 51 degrees    Calculated T Axis 30 degrees    Diagnosis       Normal sinus rhythm  Possible Left atrial enlargement  Borderline ECG  No previous ECGs available     URINALYSIS W/ REFLEX CULTURE    Collection Time: 08/27/19  8:50 PM   Result Value Ref Range    Color YELLOW/STRAW Appearance CLEAR CLEAR      Specific gravity <1.005 1.003 - 1.030    pH (UA) 7.5 5.0 - 8.0      Protein NEGATIVE  NEG mg/dL    Glucose NEGATIVE  NEG mg/dL    Ketone NEGATIVE  NEG mg/dL    Bilirubin NEGATIVE  NEG      Blood NEGATIVE  NEG      Urobilinogen 0.2 0.2 - 1.0 EU/dL    Nitrites NEGATIVE  NEG      Leukocyte Esterase NEGATIVE  NEG      WBC 0-4 0 - 4 /hpf    RBC 0-5 0 - 5 /hpf    Epithelial cells FEW FEW /lpf    Bacteria NEGATIVE  NEG /hpf    UA:UC IF INDICATED CULTURE NOT INDICATED BY UA RESULT CNI     DRUG SCREEN, URINE    Collection Time: 08/27/19  8:50 PM   Result Value Ref Range    AMPHETAMINES NEGATIVE  NEG      BARBITURATES NEGATIVE  NEG      BENZODIAZEPINES NEGATIVE  NEG      COCAINE NEGATIVE  NEG      METHADONE NEGATIVE  NEG      OPIATES NEGATIVE  NEG      PCP(PHENCYCLIDINE) NEGATIVE  NEG      THC (TH-CANNABINOL) NEGATIVE  NEG      Drug screen comment (NOTE)        IMAGING RESULTS:  CT HEAD WO CONT    (Results Pending)     No results found. MEDICATIONS GIVEN:  Medications   sodium chloride 0.9 % bolus infusion 1,000 mL (1,000 mL IntraVENous New Bag 8/27/19 1951)   LORazepam (ATIVAN) injection 1 mg (has no administration in time range)   LORazepam (ATIVAN) injection 1 mg (1 mg IntraVENous Given 8/27/19 2130)       IMPRESSION:  1. Ingestion of unknown drug, undetermined intent, initial encounter        PLAN:  1. Admit to Dr Suzy Tim hospitalist             Provider Notes (Medical Decision Making):   DDX reaction to herbal medication hallucinations stress reaction insomnia  Procedures:  Procedures    Please note that this dictation was completed with Dragon, computer voice recognition software. Quite often unanticipated grammatical, syntax, homophones, and other interpretive errors are inadvertently transcribed by the computer software. Please disregard these errors. Additionally, please excuse any errors that have escaped final proofreading. Diagnosis     Clinical Impression:   1.  Ingestion of unknown drug, undetermined intent, initial encounter

## 2019-08-28 NOTE — ED NOTES
Placed patient in 4 point restraints. Pt attached to cardiac monitor and continuous pulse/blood pressure. Will continue to monitor and observe patient. Also placed padding against the side rails due to the patient thrashing her head. Updated patients  and explained what the next steps of action were.  verbalized understanding.

## 2019-08-28 NOTE — ED NOTES
The lab called and said the 4 am labs were hemolyzed. Reported off to the nurses taking over care of patient that they needed to redraw the labs.

## 2019-08-28 NOTE — PROGRESS NOTES
.. TRANSFER - OUT REPORT:    Verbal report given to Chloe Guillen RN (name) on Glory Fat  being transferred to [facility] (unit) for routine progression of care       Report consisted of patients Situation, Background, Assessment and   Recommendations(SBAR). Information from the following report(s) SBAR and Kardex was reviewed with the receiving nurse. Lines:       Opportunity for questions and clarification was provided.       Patient transported with:  [facility]

## 2019-08-28 NOTE — H&P
U [facility]  Admission History and Physical      NAME:  Ron Dykes   :   1980   MRN:  [id number]     PCP:  None     Date/Time:  2019           Assessment/Plan:         My assessment of this patient's clinical condition and my plan of care is as follows:      Given the patient's current clinical presentation, I have a high level of concern for decompensation if discharged from the ED. Complex decision making was performed which includes reviewing the patient's available past medical records, laboratory results, and Xray films. I have also directly communicated my plan and discussed this case with the involved ED physician.      Assessment / Plan:  Toxic Ingestion of Herbal Meds  UDS negative    IVF hydration  Monitor LFTs    Altered Mental Status   Due to herbal meds,toxic ingestion    restraints for safety   IM Haldol in ED    CT head ordered  Neuro checks  Neuro consult     DVT Prophylaxis: SCDs  GI Prophylaxis:IV protonix  Code status: full code  Surrogate Decision Maker:   Baseline: AAOX3              Subjective:     CHIEF COMPLAINT: hallucinations      HISTORY OF PRESENT ILLNESS:     Ms. Albert Hernandez is a 43 y.o.  female who is admitted with toxic ingestion of herbs, hallucinations. Ms. Albert Hernandez presented to the Emergency Department today complaining of hallucinations. Patient took chinese herbal medication for pain in her feet. Patient has sudden onset confusion and agitation this afternoon. Patient was having hallucinations. Patient family noted she was diaphoretic, face was flushed. Patient is usually AAOx3, however she is AAOx1, disoriented, confused. Patient does not have a medical history, not on any prescription medication. Patient is a poor historian due to acute condition. Patient admitted for observation due to toxic ingestion and mental status. Will consult neurology for further recommendations. History reviewed. No pertinent past medical history. History reviewed. No pertinent surgical history. Social History     Tobacco Use    Smoking status: Never Smoker    Smokeless tobacco: Never Used   Substance Use Topics    Alcohol use: Not Currently        History reviewed. No pertinent family history. No Known Allergies     Prior to Admission medications    Not on File         Review of Systems:  ROS could not be obtained due to mental status changes see HPI         Objective:      VITALS:    Vital signs reviewed; most recent are:    Visit Vitals  /87   Pulse 83   Temp 97.3 °F (36.3 °C)   Resp 22   Wt 61.2 kg (135 lb)   SpO2 98%     SpO2 Readings from Last 6 Encounters:   08/27/19 98%        No intake or output data in the 24 hours ending 08/27/19 7332         Exam:     Physical Exam:via Telemedicine with assistance of RN    Gen:  Well-developed, well-nourished, confused agitated, combative  HEENT:  Pink conjunctivae, PERRL, hearing intact to voice, moist mucous membranes  Neck:  Supple, without masses, thyroid non-tender  Resp:  No accessory muscle use, clear breath sounds without wheezes rales or rhonchi  Card:  No murmurs, normal S1, S2 without thrills, bruits or peripheral edema  Abd:  Soft, non-tender, non-distended, normoactive bowel sounds are present, no palpable organomegaly  Musc:  Moves all extremities, NROM, no edema BL LE  Skin:  No rashes or ulcers, skin turgor is good  Neuro:  Awake, Alert, disoriented, Cranial nerves 3-12 are grossly appear intact, moves all extremities, confused, agitated   Psych:  Confused, restless agitated combative        Labs:    Recent Labs     08/27/19 1916   WBC 7.9   HGB 13.6   HCT 40.1        Recent Labs     08/27/19 1916      K 3.7      CO2 27   *   BUN 10   CREA 0.66   CA 9.8   ALB 4.3   SGOT 18   ALT 33     No components found for: GLPOC  No results for input(s): PH, PCO2, PO2, HCO3, FIO2 in the last 72 hours. No results for input(s): INR in the last 72 hours.     No lab exists for component: INREXT      Total time spent with patient: 30 [address] discussed with: Patient and >50% of time spent in counseling and coordination of care    Discussed:  Code Status and Care Plan    Prophylaxis:  SCD's and H2B/PPI    Probable Disposition:  Home w/Family           ___________________________________________________    Attending Physician: Teresa Mckay MD

## 2019-08-28 NOTE — ED NOTES
Able to transport patient to CT for scan with assistance. Help patient down while getting the CT performed.

## 2019-08-28 NOTE — PROGRESS NOTES
409: TRANSFER - IN REPORT:    Verbal reported to Ramy Moya RN received from Jonah Co (name) on [employer]  being received from ED(unit) for routine progression of care      Report consisted of patients Situation, Background, Assessment and   Recommendations(SBAR). Information from the following report(s) SBAR was reviewed with the receiving nurse. Opportunity for questions and clarification was provided. Assessment completed upon patients arrival to unit and care assumed. Patient has not yet arrived. Note to follow when arrives. Restraints have been removed per ED RN.    2402: Patient up to the floor.  at the bedside. She appears agitated and restless and when vitals were taken, she was attempting to kick staff. Multiple nursing staff at the bedside. Unable to complete the admission database assessment at this time due to patient confusion. 2575: Dual Skin assessment completed with Ludmila Pump RN. Abnormalities were: 1. Scratches to bilateral arms. 2. Redness to left upper flank, which is blanchable. 46: Speaking to  via  phone.  #[id number]    0500: Unable to draw labs at this time, as patient is too combative. Sitter 1:1 at this time. Pads placed for safety as patient is very impulsive and thrashes in the bed suddenly. 0535;Patient constantly moving or pulling her IV line and is not redirectable. Every effort taken to preserve line and patient safety. RN at bedside, as IV pump constantly needs restarting. Arm board not an option due to patient strength. 0550: 2 point nonviolent restraints ordered. See flowsheet. 46: Message sent to pharmacy to have ativan verified,    Galina Serrano 29: Ativan given per order. It look 5 people to be able to give. Patient combative while trying to give. Remains confused, hallucinating, agitated. And restless. Remain 1:1    0627: Messaged Telehospitalist \"Patient has not responded to the Ativan.  She remains very agitated and restless. We have had to have up to 5 people to help keep her safe in bed. She is kicking a lot in the 2 point restraints. Do you want to upgrade to PCU? Do you want 4 point restraints? Do you want to prescribe anything else for agitation? \"    9079: 4 point nonviolent restraints ordered. See flowsheet. 2997: Messaged Telehospitalist \"Patient's MEWs score is 6, base on her vitals. It advises to notify the MD. 97.8 axillary, 113 pulse, 24 respiratory rate, 100 on RA, confusion, and /71. \"    9687: IV no longer functional. Removing now.

## 2019-08-28 NOTE — ED NOTES
TRANSFER - OUT REPORT:    Verbal report given to Michael Jarvis (name) on Ron Fail  being transferred to [facility](unit) for routine progression of care       Report consisted of patients Situation, Background, Assessment and   Recommendations(SBAR). Information from the following report(s) SBAR was reviewed with the receiving nurse. Lines:   Peripheral IV 08/27/19 Right Antecubital (Active)   Site Assessment Clean, dry, & intact 8/27/2019  7:35 PM   Phlebitis Assessment 0 8/27/2019  7:35 PM   Infiltration Assessment 0 8/27/2019  7:35 PM   Dressing Status Clean, dry, & intact 8/27/2019  7:35 PM   Dressing Type Transparent 8/27/2019  7:35 PM   Hub Color/Line Status Pink;Flushed 8/27/2019  7:35 PM   Action Taken Blood drawn 8/27/2019  7:35 PM        Opportunity for questions and clarification was provided.       Patient transported with:   Monitor   Registered nurse

## 2019-08-28 NOTE — PROGRESS NOTES
1109) Ativan given for pt agitation  1230) Discuss with Dr. Alee Bhatia, ativan not help sedation. Poison control recommendation-- higher levels sedation with ativan or versed; recommend IV fluids and repeat EKG. Recommend labs CMP, CPK, lacate, venous blood gas. 1238) Second dose of ativan given. ) Speak to [address] [address]) IV started L arm with assistance of Freya Oleary RN and Libra Mahoney Lluvia De Médicis) [facility] left with pt; soft restraints applied.

## 2019-08-28 NOTE — H&P
[facility]  HISTORY AND PHYSICAL    Name:  Tex Pan  MR#:  [id number]  :  1980  ACCOUNT #:  [de-identified]  ADMIT DATE:  2019    CHIEF COMPLAINT:  Vomiting, headache, and itchy throat. HISTORY OF PRESENT ILLNESS:  The patient is a 69-year-old female with no significant past medical history presented to emergency room with behavioral changes. The  at bedside provided the information, per patient very agitated.  states, 2 weeks ago, they drove to [place] to see a [facility] doctor for her insomnia.  reports that she had somehow similar symptoms 2 years ago, but did not last long. She was overheated and then she saw a doctor, discharged, no admission, improved the next day, but she saw a doctor in [place], given herbal medication. She started taking herbal medication for 2 days and she takes break between 2 days of taking herbal tea. She has been doing this since she was discharged and she left office of that doctor. Received a call from her son regarding the patient was not acting right. Her face was red and agitated. They were concerned about her status, so they brought her to the emergency room. When she arrived in the emergency room, she was confused, agitated, and hallucinating. She was disoriented with aggressive behavior, difficult to calm her down. A consult placed with Poison Control. They advised symptomatic management, benzodiazepine, haloperidol. Lab, routine lab work, followup lab work; pH, CK, CBC, CMP as well as Neurology consult. PAST MEDICAL HISTORY: History reviewed. No pertinent past medical history. PAST SURGICAL HISTORY: History reviewed. No pertinent surgical history.       SOCIAL HISTORY:   Social History     Socioeconomic History    Marital status:      Spouse name: Not on file    Number of children: Not on file    Years of education: Not on file    Highest education level: Not on file   Occupational History    Not on file   Social Needs    Financial resource strain: Not on file    Food insecurity:     Worry: Not on file     Inability: Not on file    Transportation needs:     Medical: Not on file     Non-medical: Not on file   Tobacco Use    Smoking status: Never Smoker    Smokeless tobacco: Never Used   Substance and Sexual Activity    Alcohol use: Not Currently    Drug use: Not Currently    Sexual activity: Not on file   Lifestyle    Physical activity:     Days per week: Not on file     Minutes per session: Not on file    Stress: Not on file   Relationships    Social connections:     Talks on phone: Not on file     Gets together: Not on file     Attends [employer] service: Not on file     Active member of club or organization: Not on file     Attends meetings of clubs or organizations: Not on file     Relationship status: Not on file    Intimate partner violence:     Fear of current or ex partner: Not on file     Emotionally abused: Not on file     Physically abused: Not on file     Forced sexual activity: Not on file   Other Topics Concern    Not on file   Social History Narrative    Not on file         FAMILY HISTORY: History reviewed. No pertinent family history. ALLERGIES: No Known Allergies      PRIOR TO ADMISSION MEDICATIONS:  No current facility-administered medications on file prior to encounter. No current outpatient medications on file prior to encounter. REVIEW OF SYSTEMS: Review of Systems   Reason unable to perform ROS: Patient delirious and agitated        PHYSICAL EXAMINATION:    Visit Vitals  BP (!) 138/97   Pulse 87   Temp 97.7 °F (36.5 °C)   Resp 16   Wt 61.2 kg (135 lb)   SpO2 97%   Physical Exam   Constitutional: She is oriented to person, place, and time. No distress. HENT:   Head: Normocephalic and atraumatic. Eyes: Right eye exhibits no discharge. Left eye exhibits no discharge. No scleral icterus. Neck: No tracheal deviation present. No thyromegaly present. Cardiovascular: Normal rate, regular rhythm, normal heart sounds and intact distal pulses. Exam reveals no gallop. No murmur heard. Pulmonary/Chest: No respiratory distress. She has no wheezes. She has no rales. She exhibits no tenderness. Abdominal: She exhibits no distension and no mass. There is no tenderness. There is no rebound and no guarding. Musculoskeletal: She exhibits no edema or tenderness. Lymphadenopathy:     She has no cervical adenopathy. Neurological: She is alert and oriented to person, place, and time. She displays normal reflexes. No cranial nerve deficit. She exhibits normal muscle tone. Coordination normal. GCS score is 15. Skin: No rash noted. She is not diaphoretic. No erythema. No pallor. Psychiatric: Her mood appears anxious. Her affect is labile. She is agitated. She expresses impulsivity. Recent Results (from the past 24 hour(s))   CK W/ CKMB & INDEX    Collection Time: 08/28/19  1:35 PM   Result Value Ref Range    CK 1,188 (H) 26 - 192 U/L    CK - MB 13.1 (H) <3.6 NG/ML    CK-MB Index 1.1 0.0 - 2.5     Results for Ronan White (MRN [id number]) as of 8/29/2019 12:47   Ref.  Range 8/27/2019 19:16 8/28/2019 13:35   Sodium Latest Ref Range: 136 - 145 mmol/L 141    Potassium Latest Ref Range: 3.5 - 5.1 mmol/L 3.7    Chloride Latest Ref Range: 97 - 108 mmol/L 104    CO2 Latest Ref Range: 21 - 32 mmol/L 27    Anion gap Latest Ref Range: 5 - 15 mmol/L 10    Glucose Latest Ref Range: 65 - 100 mg/dL 106 (H)    BUN Latest Ref Range: 6 - 20 MG/DL 10    Creatinine Latest Ref Range: 0.55 - 1.02 MG/DL 0.66    BUN/Creatinine ratio Latest Ref Range: 12 - 20   15    Calcium Latest Ref Range: 8.5 - 10.1 MG/DL 9.8    GFR est non-AA Latest Ref Range: >60 ml/min/1.73m2 >60    GFR est AA Latest Ref Range: >60 ml/min/1.73m2 >60    Bilirubin, total Latest Ref Range: 0.2 - 1.0 MG/DL 0.3    Protein, total Latest Ref Range: 6.4 - 8.2 g/dL 7.8    Albumin Latest Ref Range: 3.5 - 5.0 g/dL 4.3 Globulin Latest Ref Range: 2.0 - 4.0 g/dL 3.5    A-G Ratio Latest Ref Range: 1.1 - 2.2   1.2    ALT (SGPT) Latest Ref Range: 12 - 78 U/L 33    AST Latest Ref Range: 15 - 37 U/L 18    Alk. phosphatase Latest Ref Range: 45 - 117 U/L 45    CK Latest Ref Range: 26 - 192 U/L 101 1,188 (H)   CK - MB Latest Ref Range: <3.6 NG/ML <1.0 13.1 (H)   CK-MB Index Latest Ref Range: 0.0 - 2.5   Cannot be calculated 1.1   Troponin-I, Qt. Latest Ref Range: <0.05 ng/mL <0.05    HCG, Ql. Latest Ref Range: NEG   NEGATIVE    Results for Constanza Luis (MRN [id number]) as of 8/29/2019 12:47   Ref. Range 8/27/2019 19:16   WBC Latest Ref Range: 3.6 - 11.0 K/uL 7.9   NRBC Latest Ref Range: 0  WBC 0.0   RBC Latest Ref Range: 3.80 - 5.20 M/uL 4.40   HGB Latest Ref Range: 11.5 - 16.0 g/dL 13.6   HCT Latest Ref Range: 35.0 - 47.0 % 40.1   MCV Latest Ref Range: 80.0 - 99.0 FL 91.1   MCH Latest Ref Range: 26.0 - 34.0 PG 30.9   MCHC Latest Ref Range: 30.0 - 36.5 g/dL 33.9   RDW Latest Ref Range: 11.5 - 14.5 % 11.6   PLATELET Latest Ref Range: 150 - 400 K/uL 231   MPV Latest Ref Range: 8.9 - 12.9 FL 10.2   NEUTROPHILS Latest Ref Range: 32 - 75 % 71   LYMPHOCYTES Latest Ref Range: 12 - 49 % 23   MONOCYTES Latest Ref Range: 5 - 13 % 6   EOSINOPHILS Latest Ref Range: 0 - 7 % 0   BASOPHILS Latest Ref Range: 0 - 1 % 0   IMMATURE GRANULOCYTES Latest Ref Range: 0.0 - 0.5 % 0   DF Latest Units:   AUTOMATED   ABSOLUTE NRBC Latest Ref Range: 0.00 - 0.01 K/uL 0.00   ABS. NEUTROPHILS Latest Ref Range: 1.8 - 8.0 K/UL 5.5   ABS. IMM. GRANS. Latest Ref Range: 0.00 - 0.04 K/UL 0.0   ABS. LYMPHOCYTES Latest Ref Range: 0.8 - 3.5 K/UL 1.8   ABS. MONOCYTES Latest Ref Range: 0.0 - 1.0 K/UL 0.5   ABS. EOSINOPHILS Latest Ref Range: 0.0 - 0.4 K/UL 0.0   ABS. BASOPHILS Latest Ref Range: 0.0 - 0.1 K/UL 0.0         ASSESSMENT AND PLAN:  1. Toxic ingestion of herbal medication. Urine drug screen negative. We will continue IV fluids.   We will sedate the patient with haloperidol and Ativan. The patient needs soft restrains. CT scan of the head shows not resulted , result is pending. 2.  Consultation, Neurology and Psychiatry. 3.  Deep venous thrombosis prophylaxis, sequential compression device. 4.  Gastrointestinal prophylaxis, Protonix. 5.  Code status, full code. I called the office of the prescriber physician regarding content of the herbal medication and the chemical content of herbal medication at [phone] and left voice message to consult them again.       Heena Becker MD      [id number]  D:  08/28/2019 9:10  T:  08/28/2019 10:41  JOB #:  [id number]

## 2019-08-28 NOTE — ED NOTES
Pt is becoming more and more agitated. Pt hallucinating and picking at things around her. Pt is picking at something and trying to eat it. Pt given ativan to calm her down. Pt unable to complete the liter of fluids currently due to pulling at lines. Pt currently in bed with side rails up. Family and primary nurse at bedside trying to keep patient in bed. Pt picking at others clothing and grabbing at things that aren't there.

## 2019-08-28 NOTE — ED NOTES
Per nursing supervision, took patient out of restraints and gave ativan IV to see if patient could tolerate it. Pt resting in bed. No agitation or aggression noted at this time.

## 2019-08-28 NOTE — ED NOTES
Poison control called for an update on patient. Told patient that the patient was no better than when we first called if not worse. Poison control asked for vital signs and then hung up.

## 2019-08-28 NOTE — ED NOTES
After the haldol IM and benadryl, pt is more relaxed but still moving around in bed. continuing to monitor. If doesn't improve then will call hospitalist for further instructions.

## 2019-08-28 NOTE — PROGRESS NOTES
Yvonne Grant Nurse contacted & updated about the arrival of the patient. She was informed that the liquid tea & brown bag was provided to EMS to bring with the patient to their facility. Dr. Charli Garg was notified & suggested that the substance go with the patient on transfer to have toxicology testing. Braulio Avelar Nurse was informed of this.

## 2019-08-28 NOTE — PROGRESS NOTES
**Consult Information**  Member Facility: [address] [place] [address] Rd[address] MRN: [id number]  Consult ID: [id number]  Facility Time Zone: ET  Date and Time of Consult: 08/28/2019 06:28:03 AM  Requesting Clinician: Kezia Galindo  Patient Name: Alex Monique  Date of Birth: [birth year]  Gender: Female    **Clinical Note**  Clinical Note: Pt remains agitated even after a dose of ativan. - will try Haldol IM 5 mg once. - also, 4 point restraints. **Attestation**  Interaction Mode: Electronic Interaction  Interaction Attestation: Interprofessional internet consultation was delivered through telephonic and/ or electronic communication upon the request of the patients treating physician, while the requesting and the rendering provider were not in the same physical location. Written report was provided to the requesting provider.   Evaluation Duration (mins): 2

## 2019-08-28 NOTE — PROGRESS NOTES
1 CM inform RN and MD patient has [employer] and to talk to family and patient about responsibility of bills. 1059 CM contact patient  Celia Fontenot [phone] states he will come back to hospital and talk to CM about insurance. 200 Spoke with patient  Celia Fontenot via blue phone inform of insurance [employer].  states he will not be able to manage the bill please transfer. 1139 CM inform Dr. Arleth Devi calling transport to [facility]. CM gave address and number to . No further questions.     [address]  [phone]

## 2019-08-29 NOTE — CONSULTS
PSYCHIATRY CONSULT NOTE:    REASON FOR CONSULT:  agitation      HISTORY OF PRESENTING COMPLAINT:  Keo Gaming is a 43 y.o. female who was admitted to medical after drinking tea made from herbs given to her by a [facility] herbalist in [place]. She has no prior psychiatric history or substance use history. She has been confused since ingesting the substance.  could not understand her per the reports and although bilangual, she reverted to her primary language during interview. She is noted to be picking at the air and demonstrating restless behaviors. No violence or aggression. No self harm or harm to others. PAST PSYCHIATRIC HISTORY:  None    SUBSTANCE ABUSE HISTORY:  Social History     Substance and Sexual Activity   Drug Use Not Currently     Social History     Substance and Sexual Activity   Alcohol Use Not Currently     Social History     Tobacco Use   Smoking Status Never Smoker   Smokeless Tobacco Never Used       PAST MEDICAL HISTORY:  History reviewed. No pertinent past medical history. SOCIAL HISTORY:  Marital Status:     VITALS:  Visit Vitals  BP (!) 138/97   Pulse 87   Temp 97.7 °F (36.5 °C)   Resp 16   Wt 61.2 kg (135 lb)   SpO2 97%       MEDICATIONS:  No current facility-administered medications for this encounter. Current Outpatient Medications:     0.9% sodium chloride solution, 125 mL/hr by IntraVENous route continuous. , Disp: 1000 mL, Rfl: 0    LORazepam (ATIVAN) 2 mg/mL injection, 0.5 mL by IntraMUSCular route every two (2) hours as needed for Other (Agitation). Max Daily Amount: 12 mg., Disp: 1 Vial, Rfl: 0    ondansetron (ZOFRAN) 4 mg/2 mL soln, 2 mL by IntraVENous route every four (4) hours as needed for Nausea., Disp: 1 Vial, Rfl: 0    MENTAL STATUS EXAM:  Anxious  Person only  Restless speaking some [facility] and some [place].   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 Must ascertain the ingredients of the tea for toxicology purposes.   Use haldol and ativan judiciously as they can exacerbate her delirium  Thank you for this consultaion    Katherine Jurado MD  8/28/2019
